# Patient Record
Sex: FEMALE | Race: WHITE | NOT HISPANIC OR LATINO | Employment: FULL TIME | URBAN - METROPOLITAN AREA
[De-identification: names, ages, dates, MRNs, and addresses within clinical notes are randomized per-mention and may not be internally consistent; named-entity substitution may affect disease eponyms.]

---

## 2019-11-25 ENCOUNTER — TELEPHONE (OUTPATIENT)
Dept: FAMILY MEDICINE | Facility: CLINIC | Age: 39
End: 2019-11-25

## 2019-11-25 DIAGNOSIS — Z13.220 SCREENING FOR LIPID DISORDERS: Primary | ICD-10-CM

## 2019-11-25 PROBLEM — F51.04 CHRONIC INSOMNIA: Status: ACTIVE | Noted: 2019-08-21

## 2019-11-25 PROBLEM — I10 ESSENTIAL HYPERTENSION: Status: ACTIVE | Noted: 2019-08-21

## 2019-11-25 RX ORDER — TRAZODONE HYDROCHLORIDE 50 MG/1
1.5 TABLET ORAL DAILY
COMMUNITY
Start: 2019-08-12 | End: 2020-02-11

## 2019-11-25 RX ORDER — ERGOCALCIFEROL (VITAMIN D2) 10 MCG
1 TABLET ORAL DAILY
COMMUNITY

## 2019-11-25 RX ORDER — LISINOPRIL 5 MG/1
5 TABLET ORAL DAILY
COMMUNITY
Start: 2019-08-21 | End: 2020-02-11

## 2019-11-25 NOTE — TELEPHONE ENCOUNTER
----- Message from Cari Lewis sent at 11/25/2019  1:34 PM CST -----  Contact: PATIENT  CALLING CONCERNING NEEDING CHOLESTEROL BLOOD WORK DONE. PLEASE CALL PATIENT ASAP TODAY @ [phone]. THANKS, KIRA

## 2019-11-25 NOTE — TELEPHONE ENCOUNTER
Patient states that you were suppose to check her cholesterol at her last visit with you. I am not seeing where you stated that it was suppose to be ordered. Patient is requesting lab. Please advise.

## 2019-12-19 ENCOUNTER — LAB VISIT (OUTPATIENT)
Dept: LAB | Facility: HOSPITAL | Age: 39
End: 2019-12-19
Attending: INTERNAL MEDICINE
Payer: COMMERCIAL

## 2019-12-19 DIAGNOSIS — Z13.220 SCREENING FOR LIPID DISORDERS: ICD-10-CM

## 2019-12-19 LAB
CHOLEST SERPL-MCNC: 263 MG/DL (ref 120–199)
CHOLEST/HDLC SERPL: 2.6 {RATIO} (ref 2–5)
HDLC SERPL-MCNC: 101 MG/DL (ref 40–75)
HDLC SERPL: 38.4 % (ref 20–50)
LDLC SERPL CALC-MCNC: 149.4 MG/DL (ref 63–159)
NONHDLC SERPL-MCNC: 162 MG/DL
TRIGL SERPL-MCNC: 63 MG/DL (ref 30–150)

## 2019-12-19 PROCEDURE — 80061 LIPID PANEL: CPT

## 2019-12-19 PROCEDURE — 36415 COLL VENOUS BLD VENIPUNCTURE: CPT | Mod: PO

## 2019-12-20 PROBLEM — E78.2 MIXED HYPERLIPIDEMIA: Status: ACTIVE | Noted: 2019-12-20

## 2019-12-20 RX ORDER — FLUTICASONE PROPIONATE 50 MCG
SPRAY, SUSPENSION (ML) NASAL
COMMUNITY
Start: 2019-12-13 | End: 2019-12-27

## 2019-12-27 ENCOUNTER — OFFICE VISIT (OUTPATIENT)
Dept: FAMILY MEDICINE | Facility: CLINIC | Age: 39
End: 2019-12-27
Payer: COMMERCIAL

## 2019-12-27 ENCOUNTER — PATIENT MESSAGE (OUTPATIENT)
Dept: FAMILY MEDICINE | Facility: CLINIC | Age: 39
End: 2019-12-27

## 2019-12-27 VITALS
HEIGHT: 62 IN | BODY MASS INDEX: 19.88 KG/M2 | WEIGHT: 108 LBS | RESPIRATION RATE: 17 BRPM | DIASTOLIC BLOOD PRESSURE: 56 MMHG | SYSTOLIC BLOOD PRESSURE: 90 MMHG | TEMPERATURE: 98 F | HEART RATE: 64 BPM

## 2019-12-27 DIAGNOSIS — F51.04 CHRONIC INSOMNIA: Chronic | ICD-10-CM

## 2019-12-27 DIAGNOSIS — E78.2 MIXED HYPERLIPIDEMIA: Chronic | ICD-10-CM

## 2019-12-27 DIAGNOSIS — I10 ESSENTIAL HYPERTENSION: Chronic | ICD-10-CM

## 2019-12-27 DIAGNOSIS — F17.211 CIGARETTE NICOTINE DEPENDENCE IN REMISSION: ICD-10-CM

## 2019-12-27 DIAGNOSIS — Z86.39 HISTORY OF THYROIDITIS: ICD-10-CM

## 2019-12-27 DIAGNOSIS — F41.9 ANXIETY: Primary | Chronic | ICD-10-CM

## 2019-12-27 PROCEDURE — 99999 PR PBB SHADOW E&M-EST. PATIENT-LVL III: ICD-10-PCS | Mod: PBBFAC,,, | Performed by: INTERNAL MEDICINE

## 2019-12-27 PROCEDURE — 99214 PR OFFICE/OUTPT VISIT, EST, LEVL IV, 30-39 MIN: ICD-10-PCS | Mod: S$GLB,,, | Performed by: INTERNAL MEDICINE

## 2019-12-27 PROCEDURE — 99214 OFFICE O/P EST MOD 30 MIN: CPT | Mod: S$GLB,,, | Performed by: INTERNAL MEDICINE

## 2019-12-27 PROCEDURE — 99999 PR PBB SHADOW E&M-EST. PATIENT-LVL III: CPT | Mod: PBBFAC,,, | Performed by: INTERNAL MEDICINE

## 2019-12-27 RX ORDER — ESCITALOPRAM OXALATE 10 MG/1
10 TABLET ORAL DAILY
Qty: 30 TABLET | Refills: 0 | Status: SHIPPED | OUTPATIENT
Start: 2019-12-27 | End: 2020-01-29 | Stop reason: SDUPTHER

## 2019-12-27 RX ORDER — HYDROXYZINE HYDROCHLORIDE 25 MG/1
25 TABLET, FILM COATED ORAL NIGHTLY PRN
Qty: 30 TABLET | Refills: 0 | Status: SHIPPED | OUTPATIENT
Start: 2019-12-27 | End: 2020-02-11

## 2019-12-27 NOTE — TELEPHONE ENCOUNTER
Can we get records on her cervical cancer screening?  Let her know that we will be obtaining records.  Her

## 2019-12-27 NOTE — PROGRESS NOTES
Subjective:      Patient ID: Kendra Lewis is a 39 y.o. female.    Chief Complaint: Results (Lipid)    HPI     39F here for follow up. Last seen by me at [facility] on 8/21/19.     After our last visit she underwent left inguinal hernia repair with Dr. Lopez on 11/21/19. She also saw ENT on 12/13/19 for nasal congestion and reducing hearing in the left ear. She was diagnosed with sinusitis and left otitis media and given medrol dose pack and augmentin.     HTN  -Previously was on Atenolol PRN. We changed to Lisinopril 5 mg daily and cut the pill in half at one point. She had increased to 5 mg daily, but until recently with sinus infection has been fluctuating (low today- 90/56). We discussed holding until >120/80.    Insomnia/Anxiety  -Follows with Psychiatry, Dr. Gallardo in [place] for sleep onset and maintenance insomnia. Follows yearly. Last appointment in 9/2019.  -Currently taking Trazodone  mg nightly. Wears off within 4-5 hours.   -He recommended melatonin, which caused increased sedation the next few days. He also gave restoril 15 mg daily PRN (she never filled- has rx with her).  -She does have increased anxiety and low mood. We discussed getting off trazodone and starting SSRI with PRN nightly atarax. Will to try.    Depression Patient Health Questionnaire 12/27/2019   Over the last two weeks how often have you been bothered by little interest or pleasure in doing things 1   Over the last two weeks how often have you been bothered by feeling down, depressed or hopeless 1   PHQ-2 Total Score 2     HLD  -12/19/19: chol 263, tri 63, hdl 101, ldl 149  -Diet previously poor, but eating better. Repeat in 3 months.     Labs reviewed from [facility].     Review of patient's allergies indicates:  No Known Allergies    Current Outpatient Medications:     ergocalciferol, vitamin D2, 400 unit Tab, Take 1 tablet by mouth once daily. , Disp: , Rfl:     lisinopril (PRINIVIL,ZESTRIL) 5 MG tablet, Take 5  mg by mouth once daily., Disp: , Rfl:     traZODone (DESYREL) 50 MG tablet, Take 1.5 tablets by mouth once daily., Disp: , Rfl:     escitalopram oxalate (LEXAPRO) 10 MG tablet, Take 1 tablet (10 mg total) by mouth once daily., Disp: 30 tablet, Rfl: 0    hydrOXYzine HCl (ATARAX) 25 MG tablet, Take 1 tablet (25 mg total) by mouth nightly as needed for Anxiety., Disp: 30 tablet, Rfl: 0    multivitamin capsule, Take 1 capsule by mouth once daily., Disp: , Rfl:     Past Medical History:   Diagnosis Date    Anxiety 2019    Chronic insomnia 2019    Essential hypertension 2019    History of thyroiditis 2019    Mitral valve problem     Mixed hyperlipidemia 2019     Past Surgical History:   Procedure Laterality Date    INGUINAL HERNIA REPAIR Left 2019    dr. weathers    TONSILLECTOMY       Family History   Problem Relation Age of Onset    Heart disease Mother     Thyroid disease Mother     No Known Problems Father      Social History     Socioeconomic History    Marital status: Single     Spouse name: Not on file    Number of children: Not on file    Years of education: Not on file    Highest education level: Not on file   Occupational History    Not on file   Social Needs    Financial resource strain: Not on file    Food insecurity:     Worry: Not on file     Inability: Not on file    Transportation needs:     Medical: Not on file     Non-medical: Not on file   Tobacco Use    Smoking status: Former Smoker     Packs/day: 1.00     Years: 21.00     Pack years: 21.00     Types: Cigarettes     Start date: 1996     Last attempt to quit: 2017     Years since quittin.4    Smokeless tobacco: Never Used   Substance and Sexual Activity    Alcohol use: Yes     Frequency: Monthly or less     Drinks per session: 1 or 2     Binge frequency: Never    Drug use: Never    Sexual activity: Yes     Partners: Male   Lifestyle    Physical activity:     Days per week: Not on  "file     Minutes per session: Not on file    Stress: Not on file   Relationships    Social connections:     Talks on phone: Not on file     Gets together: Not on file     Attends Adventism service: Not on file     Active member of club or organization: Not on file     Attends meetings of clubs or organizations: Not on file     Relationship status: Not on file   Other Topics Concern    Not on file   Social History Narrative    Not on file      Review of Systems   Constitutional: Positive for fatigue. Negative for chills and fever.   HENT: Negative for congestion.    Eyes: Negative for visual disturbance.   Respiratory: Negative for cough, shortness of breath and wheezing.    Cardiovascular: Negative for chest pain and palpitations.   Gastrointestinal: Negative for abdominal pain and nausea.   Endocrine: Negative for cold intolerance and heat intolerance.   Genitourinary: Negative for dysuria.   Musculoskeletal: Negative for back pain and myalgias.   Skin: Negative for rash.   Allergic/Immunologic: Negative for environmental allergies.   Neurological: Negative for dizziness, syncope, light-headedness and headaches.   Hematological: Does not bruise/bleed easily.   Psychiatric/Behavioral: Positive for decreased concentration, dysphoric mood and sleep disturbance. Negative for suicidal ideas. The patient is nervous/anxious.          Objective:     Body mass index is 19.75 kg/m².  BP (!) 90/56 (BP Location: Right arm, Patient Position: Sitting, BP Method: Medium (Automatic))   Pulse 64   Temp 98.2 °F (36.8 °C)   Resp 17   Ht 5' 2" (1.575 m)   Wt 49 kg (108 lb)   LMP 12/20/2019 (Exact Date)   BMI 19.75 kg/m²       Physical Exam   Constitutional: She is oriented to person, place, and time. She appears well-developed and well-nourished. No distress.   HENT:   Head: Normocephalic.   Mouth/Throat: Oropharynx is clear and moist.   Eyes: Conjunctivae are normal.   Cardiovascular: Normal rate, regular rhythm, normal " heart sounds and intact distal pulses.   No murmur heard.  Pulmonary/Chest: Effort normal and breath sounds normal.   Abdominal: Soft. Bowel sounds are normal.   Musculoskeletal: She exhibits no tenderness.   Lymphadenopathy:     She has no cervical adenopathy.   Neurological: She is alert and oriented to person, place, and time. No sensory deficit.   Skin: Skin is warm and dry. Capillary refill takes 2 to 3 seconds. She is not diaphoretic.   Psychiatric: She has a normal mood and affect. Her behavior is normal.   Nursing note and vitals reviewed.      Lab Visit on 12/19/2019   Component Date Value Ref Range Status    Cholesterol 12/19/2019 263* 120 - 199 mg/dL Final    Comment: The National Cholesterol Education Program (NCEP) has set the  following guidelines (reference ranges) for Cholesterol:  Optimal.....................<200 mg/dL  Borderline High.............200-239 mg/dL  High........................> or = 240 mg/dL      Triglycerides 12/19/2019 63  30 - 150 mg/dL Final    Comment: The National Cholesterol Education Program (NCEP) has set the  following guidelines (reference values) for triglycerides:  Normal......................<150 mg/dL  Borderline High.............150-199 mg/dL  High........................200-499 mg/dL      HDL 12/19/2019 101* 40 - 75 mg/dL Final    Comment: The National Cholesterol Education Program (NCEP) has set the  following guidelines (reference values) for HDL Cholesterol:  Low...............<40 mg/dL  Optimal...........>60 mg/dL      LDL Cholesterol 12/19/2019 149.4  63.0 - 159.0 mg/dL Final    Comment: The National Cholesterol Education Program (NCEP) has set the  following guidelines (reference values) for LDL Cholesterol:  Optimal.......................<130 mg/dL  Borderline High...............130-159 mg/dL  High..........................160-189 mg/dL  Very High.....................>190 mg/dL      Hdl/Cholesterol Ratio 12/19/2019 38.4  20.0 - 50.0 % Final    Total  Cholesterol/HDL Ratio 12/19/2019 2.6  2.0 - 5.0 Final    Non-HDL Cholesterol 12/19/2019 162  mg/dL Final    Comment: Risk category and Non-HDL cholesterol goals:  Coronary heart disease (CHD)or equivalent (10-year risk of CHD >20%):  Non-HDL cholesterol goal     <130 mg/dL  Two or more CHD risk factors and 10-year risk of CHD <= 20%:  Non-HDL cholesterol goal     <160 mg/dL  0 to 1 CHD risk factor:  Non-HDL cholesterol goal     <190 mg/dL       No results found in the last 24 hours.   Assessment:     Encounter Diagnoses   Name Primary?    Anxiety Yes    Cigarette nicotine dependence in remission     History of thyroiditis     Mixed hyperlipidemia     Body mass index (BMI) 19.9 or less, adult     Chronic insomnia     Essential hypertension         Plan:     #History of thyroiditis   -7/18/19: TSH 1.83, normal T4 & T3 free  -following with dr. Andrade  -Thyroid uptake scan on 10/18/18 showed impaired thyroid uptake.    #HTN, controlled. ACC goal <130/80  -Previously on atenolol PRN.  -started lisinopril 5 mg daily. Hold for BP goal 120/80  - Has home BP monitor. we discussed consideration of sleep study   -control anxiety- start ssri   -7/18/19: microalbumin/cr 6, cmp, cbc normal    #BMI 19.75  -noted    #Insomnia  Longstanding  -followed by psychiatry, dr. Gallardo in [place]  -currently on trazodone  mg nightly -Likely driving by anxiety. Wean off and start atarax PRN. Start lexapro 10 mg daily    #Anxiety  -phq2 score 2  -likely source of insomnia and increased pressures. Was on lexapro years ago. Restart nightly 10 mg and atarax PRN.  -7/18/19: normal thyroid studies, b12: 1,337, ferritin 52, vit D 71    #Nicotine dependence in remission  -1 ppd x 21 years. Start 7/9/96-7/9/17    #HLD  -12/19/19: chol 263, tri 63, hdl 101, ldl 149  -10 year ascvd risk 0.2%  -healthy diet with less fried and processed foods.     #HCM  -Influenza & TDAP at next visit.     F/u in 1 month.     Has mychart. 1 month  f/up.    Jacquie Lai M.D.    No orders of the defined types were placed in this encounter.     Medications Ordered This Encounter   Medications    escitalopram oxalate (LEXAPRO) 10 MG tablet     Sig: Take 1 tablet (10 mg total) by mouth once daily.     Dispense:  30 tablet     Refill:  0    hydrOXYzine HCl (ATARAX) 25 MG tablet     Sig: Take 1 tablet (25 mg total) by mouth nightly as needed for Anxiety.     Dispense:  30 tablet     Refill:  0

## 2020-01-20 ENCOUNTER — PATIENT OUTREACH (OUTPATIENT)
Dept: ADMINISTRATIVE | Facility: HOSPITAL | Age: 40
End: 2020-01-20

## 2020-01-29 ENCOUNTER — TELEPHONE (OUTPATIENT)
Dept: FAMILY MEDICINE | Facility: CLINIC | Age: 40
End: 2020-01-29

## 2020-01-29 DIAGNOSIS — F41.9 ANXIETY: Chronic | ICD-10-CM

## 2020-01-29 DIAGNOSIS — F51.04 CHRONIC INSOMNIA: Chronic | ICD-10-CM

## 2020-01-29 RX ORDER — ESCITALOPRAM OXALATE 10 MG/1
10 TABLET ORAL DAILY
Qty: 90 TABLET | Refills: 0 | Status: SHIPPED | OUTPATIENT
Start: 2020-01-29 | End: 2020-06-03

## 2020-01-29 NOTE — TELEPHONE ENCOUNTER
----- Message from Cristina Menendez sent at 1/29/2020  8:07 AM CST -----  Contact: self  state that she only has enough escitalopram oxalate to last to 2/3. please refill..[phone]       [id number] - [place] - [address] [place] [id number]  [address]  [place] [address]  Phone: [phone] Fax: [phone]

## 2020-02-11 ENCOUNTER — OFFICE VISIT (OUTPATIENT)
Dept: FAMILY MEDICINE | Facility: CLINIC | Age: 40
End: 2020-02-11
Payer: COMMERCIAL

## 2020-02-11 VITALS
WEIGHT: 112.19 LBS | RESPIRATION RATE: 16 BRPM | OXYGEN SATURATION: 99 % | HEIGHT: 62 IN | HEART RATE: 52 BPM | BODY MASS INDEX: 20.65 KG/M2 | SYSTOLIC BLOOD PRESSURE: 103 MMHG | DIASTOLIC BLOOD PRESSURE: 65 MMHG

## 2020-02-11 DIAGNOSIS — F51.04 CHRONIC INSOMNIA: Chronic | ICD-10-CM

## 2020-02-11 DIAGNOSIS — B35.1 FUNGAL TOENAIL INFECTION: ICD-10-CM

## 2020-02-11 DIAGNOSIS — Z28.21 INFLUENZA VACCINATION DECLINED: ICD-10-CM

## 2020-02-11 DIAGNOSIS — Z23 NEED FOR TDAP VACCINATION: ICD-10-CM

## 2020-02-11 DIAGNOSIS — F41.9 ANXIETY: Primary | Chronic | ICD-10-CM

## 2020-02-11 PROBLEM — I10 ESSENTIAL HYPERTENSION: Status: RESOLVED | Noted: 2019-08-21 | Resolved: 2020-02-11

## 2020-02-11 PROCEDURE — 99214 PR OFFICE/OUTPT VISIT, EST, LEVL IV, 30-39 MIN: ICD-10-PCS | Mod: 25,S$GLB,, | Performed by: INTERNAL MEDICINE

## 2020-02-11 PROCEDURE — 99999 PR PBB SHADOW E&M-EST. PATIENT-LVL IV: ICD-10-PCS | Mod: PBBFAC,,, | Performed by: INTERNAL MEDICINE

## 2020-02-11 PROCEDURE — 90715 TDAP VACCINE GREATER THAN OR EQUAL TO 7YO IM: ICD-10-PCS | Mod: S$GLB,,, | Performed by: INTERNAL MEDICINE

## 2020-02-11 PROCEDURE — 90471 IMMUNIZATION ADMIN: CPT | Mod: S$GLB,,, | Performed by: INTERNAL MEDICINE

## 2020-02-11 PROCEDURE — 90715 TDAP VACCINE 7 YRS/> IM: CPT | Mod: S$GLB,,, | Performed by: INTERNAL MEDICINE

## 2020-02-11 PROCEDURE — 90471 TDAP VACCINE GREATER THAN OR EQUAL TO 7YO IM: ICD-10-PCS | Mod: S$GLB,,, | Performed by: INTERNAL MEDICINE

## 2020-02-11 PROCEDURE — 99214 OFFICE O/P EST MOD 30 MIN: CPT | Mod: 25,S$GLB,, | Performed by: INTERNAL MEDICINE

## 2020-02-11 PROCEDURE — 99999 PR PBB SHADOW E&M-EST. PATIENT-LVL IV: CPT | Mod: PBBFAC,,, | Performed by: INTERNAL MEDICINE

## 2020-02-29 PROBLEM — R87.610 ASCUS OF CERVIX WITH NEGATIVE HIGH RISK HPV: Status: ACTIVE | Noted: 2020-02-29

## 2020-03-09 ENCOUNTER — PATIENT OUTREACH (OUTPATIENT)
Dept: ADMINISTRATIVE | Facility: OTHER | Age: 40
End: 2020-03-09

## 2020-03-10 ENCOUNTER — OFFICE VISIT (OUTPATIENT)
Dept: PODIATRY | Facility: CLINIC | Age: 40
End: 2020-03-10
Payer: COMMERCIAL

## 2020-03-10 VITALS
SYSTOLIC BLOOD PRESSURE: 104 MMHG | WEIGHT: 112.19 LBS | HEIGHT: 62 IN | BODY MASS INDEX: 20.65 KG/M2 | HEART RATE: 68 BPM | DIASTOLIC BLOOD PRESSURE: 67 MMHG

## 2020-03-10 DIAGNOSIS — L60.8 DISCOLORATION OF NAILBEDS: ICD-10-CM

## 2020-03-10 DIAGNOSIS — L60.3 NAIL DYSTROPHY: Primary | ICD-10-CM

## 2020-03-10 PROCEDURE — 99203 OFFICE O/P NEW LOW 30 MIN: CPT | Mod: S$GLB,,, | Performed by: PODIATRIST

## 2020-03-10 PROCEDURE — 3074F PR MOST RECENT SYSTOLIC BLOOD PRESSURE < 130 MM HG: ICD-10-PCS | Mod: CPTII,S$GLB,, | Performed by: PODIATRIST

## 2020-03-10 PROCEDURE — 3008F BODY MASS INDEX DOCD: CPT | Mod: CPTII,S$GLB,, | Performed by: PODIATRIST

## 2020-03-10 PROCEDURE — 3078F DIAST BP <80 MM HG: CPT | Mod: CPTII,S$GLB,, | Performed by: PODIATRIST

## 2020-03-10 PROCEDURE — 3074F SYST BP LT 130 MM HG: CPT | Mod: CPTII,S$GLB,, | Performed by: PODIATRIST

## 2020-03-10 PROCEDURE — 99203 PR OFFICE/OUTPT VISIT, NEW, LEVL III, 30-44 MIN: ICD-10-PCS | Mod: S$GLB,,, | Performed by: PODIATRIST

## 2020-03-10 PROCEDURE — 3078F PR MOST RECENT DIASTOLIC BLOOD PRESSURE < 80 MM HG: ICD-10-PCS | Mod: CPTII,S$GLB,, | Performed by: PODIATRIST

## 2020-03-10 PROCEDURE — 99999 PR PBB SHADOW E&M-EST. PATIENT-LVL III: ICD-10-PCS | Mod: PBBFAC,,, | Performed by: PODIATRIST

## 2020-03-10 PROCEDURE — 3008F PR BODY MASS INDEX (BMI) DOCUMENTED: ICD-10-PCS | Mod: CPTII,S$GLB,, | Performed by: PODIATRIST

## 2020-03-10 PROCEDURE — 99999 PR PBB SHADOW E&M-EST. PATIENT-LVL III: CPT | Mod: PBBFAC,,, | Performed by: PODIATRIST

## 2020-03-10 RX ORDER — CICLOPIROX 80 MG/ML
SOLUTION TOPICAL NIGHTLY
Qty: 1 BOTTLE | Refills: 2 | Status: SHIPPED | OUTPATIENT
Start: 2020-03-10 | End: 2021-03-28 | Stop reason: SDUPTHER

## 2020-03-10 NOTE — PROGRESS NOTES
Subjective:       Patient ID: Kendra Lewis is a 39 y.o. female.    Chief Complaint: Nail Care (patient does not like appearance of nails. denies pain at present. was give rx for nail polish to treat fungus 11/2019, patient c/o nails started looking worse after starting treatment. pt is not a diabetic)      HPI:  Patient presents to the office this morning, with complaint of thickening and discoloration to the left 2nd toe.  States that the initial problem started with a nail salon and continue to paint fingernail Polish over the nails.  States that she noticed a whitish discoloration to the dorsal aspect of all the nails on her right left foot.  States that the left 2nd toe became thickened and slightly elevated.  She states she does not like the appearance this nail.  Reports that she saw another physician who recommended a topical paint, however patient did not utilize this as he wanted to charge her $50 cash.   She reports no pain to the nails.  States that she utilize Lamisil in the past, however with her new out luck of less medication and less duration of needed medication, she would look for other options.  This patient's PMD is Jacquie Lai MD.    Review of patient's allergies indicates:  No Known Allergies    Past Medical History:   Diagnosis Date    Anxiety 12/27/2019    ASCUS of cervix with negative high risk HPV 2/29/2020    04/24/15    Chronic insomnia 8/21/2019    Essential hypertension 8/21/2019    History of thyroiditis 12/27/2019    Mitral valve problem     Mixed hyperlipidemia 12/27/2019       Family History   Problem Relation Age of Onset    Heart disease Mother     Thyroid disease Mother     No Known Problems Father        Social History     Socioeconomic History    Marital status: Single     Spouse name: Not on file    Number of children: Not on file    Years of education: Not on file    Highest education level: Not on file   Occupational History    Not on file    Social Needs    Financial resource strain: Not on file    Food insecurity:     Worry: Not on file     Inability: Not on file    Transportation needs:     Medical: Not on file     Non-medical: Not on file   Tobacco Use    Smoking status: Former Smoker     Packs/day: 1.00     Years: 21.00     Pack years: 21.00     Types: Cigarettes     Start date: 1996     Last attempt to quit: 2017     Years since quittin.6    Smokeless tobacco: Never Used   Substance and Sexual Activity    Alcohol use: Yes     Alcohol/week: 1.0 standard drinks     Types: 1 Standard drinks or equivalent per week     Frequency: Monthly or less     Drinks per session: 1 or 2     Binge frequency: Never    Drug use: Never    Sexual activity: Yes     Partners: Male     Birth control/protection: None   Lifestyle    Physical activity:     Days per week: Not on file     Minutes per session: Not on file    Stress: Not on file   Relationships    Social connections:     Talks on phone: Not on file     Gets together: Not on file     Attends Hinduism service: Not on file     Active member of club or organization: Not on file     Attends meetings of clubs or organizations: Not on file     Relationship status: Not on file   Other Topics Concern    Not on file   Social History Narrative    Not on file       Past Surgical History:   Procedure Laterality Date    INGUINAL HERNIA REPAIR Left 2019    dr. weathers    TONSILLECTOMY         Review of Systems   Constitutional: Negative for activity change, appetite change, chills and fever.   HENT: Negative for sinus pain, sore throat and voice change.    Eyes: Negative for pain, redness and visual disturbance.   Respiratory: Negative for cough and shortness of breath.    Cardiovascular: Negative for chest pain and palpitations.   Gastrointestinal: Negative for diarrhea, nausea and vomiting.   Musculoskeletal: Negative for back pain and joint swelling.   Skin: Negative for color change and  "wound.   Neurological: Negative for dizziness, weakness and numbness.   Psychiatric/Behavioral: The patient is not nervous/anxious.           Objective:   /67 (BP Location: Left arm, Patient Position: Sitting, BP Method: Medium (Automatic))   Pulse 68   Ht 5' 2" (1.575 m)   Wt 50.9 kg (112 lb 3.4 oz)   BMI 20.52 kg/m²     No image results found.       Physical Exam    LOWER EXTREMITY PHYSICAL EXAMINATION  NEUROLOGY: Sensation to light touch is intact. Proprioception is intact. Protective sensation is intact    DERMATOLOGY:  Skin is moist, supple, intact. No ulcerations are noted. No hyperkeratosis or calluses are noted. No ecchymosis appreciated.  The left 2nd toenail is slightly elevated centrally with discoloration noted to the distal 1/2 of the nail.  The remaining nails on the right left foot do have areas of whitish discoloration present to the nail plates.  There is no thickening to the other nails.  There is no erythema or sign of acute infection.    ORTHOPEDIC: Manual Muscle Testing is 5/5 in all planes on the left and right, without pains, with and without resistance. Gait pattern is non-antalgic.    VASCULAR: The right DP pulse is 2/4 and the left DP is 2/4. The right PT pulse is 2/4 and the left PT pulse is 2/4. Proximal to distal, warm to warm. No dependent rubor or elevation palor is noted. Capillary refill time is less than 3 seconds. Hair growth is appreciated to the dorsal foot and digits.    Assessment:     1. Nail dystrophy    2. Discoloration of nailbeds        Plan:     Nail dystrophy  -     Ambulatory referral/consult to Podiatry    Discoloration of nailbeds    Other orders  -     ciclopirox (PENLAC) 8 % Soln; Apply topically nightly.  Dispense: 1 Bottle; Refill: 2      We discussed possible etiology associated with nail dystrophy.  We discussed that the discoloration could be caused by fungus, however it also could be caused by trauma, vitamin insufficiencies, and use of topical " fingernail paints from the formaldehyde.  We discussed taking a nail biopsy and sent it to lab to undergo KOH staining.  Patient stated that she was not interested in this option as it may cost increased funds.  We discussed both conservative and surgical treatment for nail dystrophy.  Definitive antifungal treatment options were discussed and reviewed with the patient at initial visit. We discussed oral medication, topical medication, and Vicks vapor rub with both risk and benefits of each of these options.  Also discussed temporary versus permanent removal of the entire nail plate to prevent recurrence.  We discussed possible oral medication of Lamisil use and the risks and benefits associated with this.  We discussed taking liver enzyme test as well to verify that there is no contraindication for this medication.  Patient relates that with her new outlook on life of decreased use of unnecessary medications and the decreased duration of needed medications, patient was not interested in Lamisil.  She did however express interest in using Penlac as a topical medication.  I discussed with her using this as directed with long treatment duration.  Stated understanding with this.  Patient states she will get this several months to see if she has noticed a difference in the color and texture of her nails.      Future Appointments   Date Time Provider Department Center   8/11/2020  9:00 AM Jacquie Lai MD [facility] [place]

## 2020-03-10 NOTE — LETTER
March 10, 2020      Jacquie Lai MD  [address]  [place] [address]           [place] [facility]  [address]  [place] [address]  Phone: [phone]  Fax: [phone]          Patient: Kendra Lewis   MR Number: [id number]   YOB: 1980   Date of Visit: 3/10/2020       Dear Dr. Jacquie Lai:    Thank you for referring Kendra Lewis to me for evaluation. Attached you will find relevant portions of my assessment and plan of care.    If you have questions, please do not hesitate to call me. I look forward to following Kendra Lewis along with you.    Sincerely,    Tabby Humphries, ISABEL    Enclosure  CC:  No Recipients    If you would like to receive this communication electronically, please contact [email or web address] or [phone] to request more information on [employer] access.    For providers and/or their staff who would like to refer a patient to [facility], please contact us through our one-stop-shop provider referral line, [facility], at [phone].    If you feel you have received this communication in error or would no longer like to receive these types of communications, please e-mail [email or web address]

## 2020-03-30 ENCOUNTER — PATIENT MESSAGE (OUTPATIENT)
Dept: FAMILY MEDICINE | Facility: CLINIC | Age: 40
End: 2020-03-30

## 2020-03-31 ENCOUNTER — TELEPHONE (OUTPATIENT)
Dept: FAMILY MEDICINE | Facility: CLINIC | Age: 40
End: 2020-03-31

## 2020-03-31 NOTE — TELEPHONE ENCOUNTER
Called patient stated that she doesn't know if she wants to come in for a visit she wants to think about it and call back tomorrow.

## 2020-03-31 NOTE — TELEPHONE ENCOUNTER
Please see if patient would be willing to come in for a visit with a provider in person. An exam should be performed and if she has been having rectal bleeding, she may need labs as well.

## 2020-03-31 NOTE — TELEPHONE ENCOUNTER
----- Message from Jessenia Copeland sent at 3/31/2020  2:59 PM CDT -----  Type:  Patient Returning Call    Who Called: Pt  Kendra  Who Left Message for Patient:  Dr Lai office thru the Pt Portal  Does the patient know what this is regarding?: an appt  Would the patient rather a call back or a response via [facility]?   Call back  Best Call Back Number:   [phone]  Additional Information:  States she was asked if she wanted to come in today//she is calling to ask if that is a good idea with the virus//please call to discuss//rosenda

## 2020-08-05 NOTE — TELEPHONE ENCOUNTER
----- Message from Victor M Renee sent at 1/29/2020 10:10 AM CST -----  Contact: pt  Type:  Patient Returning Call    Who Called: MALIHA RAMIRES   Who Left Message for Patient:  [place]   Does the patient know what this is regarding?  Would the patient rather a call back or a response via [facility]?  Call   Best Call Back Number: [phone] (home)   Additional Information:    Called to confirm pt appt. appt confirmed.

## 2021-03-25 ENCOUNTER — PATIENT MESSAGE (OUTPATIENT)
Dept: ADMINISTRATIVE | Facility: HOSPITAL | Age: 41
End: 2021-03-25